# Patient Record
(demographics unavailable — no encounter records)

---

## 2024-12-02 NOTE — REASON FOR VISIT
[Follow-Up] : a follow-up visit [COPD] : COPD [Emphysema] : emphysema [Shortness of Breath] : shortness of breath [Nicotine Dependence] : nicotine dependence

## 2024-12-02 NOTE — HISTORY OF PRESENT ILLNESS
[Current] : current [>= 20 pack years] : >= 20 pack years [TextBox_4] :  Ms. ZION ROLLINS is a 63 year old F here to establish care for COPD/emphysema. She is accompanied by her daughter.  She previously was following with a different pulmonologist in [place] but has not seen them since 2020. She has been on Breztri and albuterol but notes that she has more episodes of shortness of breath in the past year or so. She is also using her albuterol more frequently. She has never been hospitalized or gone to ED for respiratory issues. She used prednisone about 3 months ago for sinus issues. She has been smoking off/on since the age of 19. She smoked on average 0.5-1ppd and recently quit about 1 week ago. She also notes that lately she has noticed she startles awake at night and has trouble falling back asleep. As a result, she feels more tired during the day and has to nap more frequently.  She has struggled with quitting smoking for many years. She did have success with NRT as the gum made her crave a cigarette more. She has never tried chantix or wellbutrin. This past time she self tapered down and quit on her own.  She has a daily cough that is productive of clear/white sputum.  Of note she also unintentionally lost about 20 lbs over the past 1.5 years.   12/2024 Ms. ROLLINS returns today for follow-up PFTs and LDCT. Since last visit she got COVID late 9/2024 and subsequently has felt more short of breath. She has trouble with minimal exertion now but home SpO2 readings have been >90% for the most part. She is compliant with Breztri but has been using her rescue inhaler more. She's also noticed new bilateral edema in her feet with improves after elevation or wearing more compressive stockings. [TextBox_11] : 1 [TextBox_13] : 40 [YearQuit] : 2024

## 2024-12-02 NOTE — DISCUSSION/SUMMARY
[FreeTextEntry1] : 63F here to establish care for COPD/emphysema and shortness of breath  #COPD/Emphysema #Shortness of breath #nicotine use #LE edema  - Still awaiting records from her prior pulmonologist - LDCT reviewed with Pt. She has severe emphysematous changes and a few lung nodules largest of which is 8mm which requires about a 6 month follow-up. However, Pt notes she's had lung nodules noted in her previous CTs but will need to review those records to confirm. While waiting, will plan for 6 month follow-up CT chest - She has shown clinical decline since last visit. No frequent exacerbations, but her daily maintenance triple therapy is no longer as effective since her COVID infection. She also demonstrates exertional hypoxemia now.  Upon ambulation, she showed desaturation from 90% to 84% within 5 minutes of walking. She required 2LPM NC to improve SpO2 >90%. Supplemental O2 ordered for patient to use on exertion  - TTE ordered as she has new bipedal edema concerning for RV dysfunction in setting of new hypoxemia and severe COPD - I have filled out handicap form for Pt given PFT findings and severity of symptoms causing significant disability - Peripheral eos checked to see if Pt would be candidate for dupixent - Ohtuvayre ordered - Prednisone Rx sent as part of home action plan

## 2024-12-02 NOTE — PHYSICAL EXAM
[No Acute Distress] : no acute distress [Normal Oropharynx] : normal oropharynx [Normal Appearance] : normal appearance [No Neck Mass] : no neck mass [Normal Rate/Rhythm] : normal rate/rhythm [Normal S1, S2] : normal s1, s2 [No Murmurs] : no murmurs [No Resp Distress] : no resp distress [No Acc Muscle Use] : no acc muscle use [Clear to Auscultation Bilaterally] : clear to auscultation bilaterally [No Clubbing] : no clubbing [No Cyanosis] : no cyanosis [No Edema] : no edema [No Focal Deficits] : no focal deficits [Oriented x3] : oriented x3 [Normal Affect] : normal affect [TextBox_68] : diminished breath sounds bilaterally

## 2025-02-04 NOTE — DISCUSSION/SUMMARY
[FreeTextEntry1] : 63F here to follow up  #Chronic hypoxemic respiratory failure #COPD/Emphysema #Shortness of breath #nicotine use  - LDCT previously reviewed with Pt. She is ordered for 6 month follow-up CT chest, due 4/2025 for short term follow up of her lung nodules - Doing much better now that she is on supplemental O2 and her BP is better controlled. Encouraged her to continue daily walks and to stay active with the aide of supplemental O2 - TTE report reviewed and discussed. No evidence of PH or RV dysfunction noted. - Peripheral eos checked at last visit was 130. She would not be a candidate for dupixent at this time - Ohtuvayre ordered and awaiting PA from insurance - I have provided Pt with samples of Breztri so that she does not run out prior to her next prescription refill

## 2025-02-04 NOTE — HISTORY OF PRESENT ILLNESS
[Current] : current [>= 20 pack years] : >= 20 pack years [TextBox_4] :  Ms. ZION ROLLINS is a 63 year old F here to establish care for COPD/emphysema. She is accompanied by her daughter.  She previously was following with a different pulmonologist in [place] but has not seen them since 2020. She has been on Breztri and albuterol but notes that she has more episodes of shortness of breath in the past year or so. She is also using her albuterol more frequently. She has never been hospitalized or gone to ED for respiratory issues. She used prednisone about 3 months ago for sinus issues. She has been smoking off/on since the age of 19. She smoked on average 0.5-1ppd and recently quit about 1 week ago. She also notes that lately she has noticed she startles awake at night and has trouble falling back asleep. As a result, she feels more tired during the day and has to nap more frequently.  She has struggled with quitting smoking for many years. She did have success with NRT as the gum made her crave a cigarette more. She has never tried chantix or wellbutrin. This past time she self tapered down and quit on her own.  She has a daily cough that is productive of clear/white sputum.  Of note she also unintentionally lost about 20 lbs over the past 1.5 years.   12/2024 Ms. ROLLISN returns today for follow-up PFTs and LDCT. Since last visit she got COVID late 9/2024 and subsequently has felt more short of breath. She has trouble with minimal exertion now but home SpO2 readings have been >90% for the most part. She is compliant with Breztri but has been using her rescue inhaler more. She's also noticed new bilateral edema in her feet with improves after elevation or wearing more compressive stockings.  2/2025 Ms. ROLLINS returns today for follow-up. Doing much better since last visit. She has established care with Arabella Shields as her PCP and was recently started on antihypertensives with improvement in BP control. Her leg swelling has improved. She is walking more with the use of her portable O2. She has a walking pad at home and is able to walk around 2 miles daily on flat ground. She is still having trouble with inclines. She had a malfunctioning Breztri inhaler and so will not have enough to bridge her to her next Rx refill. Her insurance will not allow her to refill it early. [TextBox_11] : 1 [TextBox_13] : 40 [YearQuit] : 2024